# Patient Record
Sex: MALE | Employment: FULL TIME | URBAN - METROPOLITAN AREA
[De-identification: names, ages, dates, MRNs, and addresses within clinical notes are randomized per-mention and may not be internally consistent; named-entity substitution may affect disease eponyms.]

---

## 2023-10-18 ENCOUNTER — HOSPITAL ENCOUNTER (OUTPATIENT)
Dept: RADIOLOGY | Facility: HOSPITAL | Age: 48
Discharge: HOME | End: 2023-10-18
Payer: COMMERCIAL

## 2023-10-18 DIAGNOSIS — I10 ESSENTIAL (PRIMARY) HYPERTENSION: ICD-10-CM

## 2023-10-18 PROCEDURE — 75571 CT HRT W/O DYE W/CA TEST: CPT

## 2025-03-13 ENCOUNTER — HOSPITAL ENCOUNTER (OUTPATIENT)
Dept: RADIOLOGY | Facility: CLINIC | Age: 50
Discharge: HOME | End: 2025-03-13
Payer: COMMERCIAL

## 2025-03-13 DIAGNOSIS — R91.1 SOLITARY PULMONARY NODULE: ICD-10-CM

## 2025-03-13 PROCEDURE — 71250 CT THORAX DX C-: CPT

## 2025-05-24 ENCOUNTER — OFFICE VISIT (OUTPATIENT)
Dept: URGENT CARE | Age: 50
End: 2025-05-24
Payer: COMMERCIAL

## 2025-05-24 VITALS — SYSTOLIC BLOOD PRESSURE: 121 MMHG | HEART RATE: 82 BPM | OXYGEN SATURATION: 96 % | DIASTOLIC BLOOD PRESSURE: 78 MMHG

## 2025-05-24 DIAGNOSIS — J02.9 SORE THROAT: ICD-10-CM

## 2025-05-24 DIAGNOSIS — J01.90 ACUTE SINUSITIS, RECURRENCE NOT SPECIFIED, UNSPECIFIED LOCATION: Primary | ICD-10-CM

## 2025-05-24 DIAGNOSIS — H10.33 ACUTE CONJUNCTIVITIS OF BOTH EYES, UNSPECIFIED ACUTE CONJUNCTIVITIS TYPE: ICD-10-CM

## 2025-05-24 LAB
POC HUMAN RHINOVIRUS PCR: NEGATIVE
POC INFLUENZA A VIRUS PCR: NEGATIVE
POC INFLUENZA B VIRUS PCR: NEGATIVE
POC RESPIRATORY SYNCYTIAL VIRUS PCR: NEGATIVE
POC STREPTOCOCCUS PYOGENES (GROUP A STREP) PCR: NEGATIVE

## 2025-05-24 RX ORDER — AMOXICILLIN 875 MG/1
875 TABLET, COATED ORAL 2 TIMES DAILY
Qty: 14 TABLET | Refills: 0 | Status: SHIPPED | OUTPATIENT
Start: 2025-05-24 | End: 2025-05-31

## 2025-05-24 RX ORDER — CITALOPRAM 20 MG/1
1 TABLET ORAL
COMMUNITY
Start: 2025-04-26

## 2025-05-24 RX ORDER — POLYMYXIN B SULFATE AND TRIMETHOPRIM 1; 10000 MG/ML; [USP'U]/ML
1 SOLUTION OPHTHALMIC EVERY 6 HOURS
Qty: 10 ML | Refills: 0 | Status: SHIPPED | OUTPATIENT
Start: 2025-05-24 | End: 2025-05-31

## 2025-05-24 ASSESSMENT — ENCOUNTER SYMPTOMS
EYE REDNESS: 1
SORE THROAT: 1
CARDIOVASCULAR NEGATIVE: 1
RESPIRATORY NEGATIVE: 1
CONSTITUTIONAL NEGATIVE: 1
EYE DISCHARGE: 1

## 2025-05-24 NOTE — PROGRESS NOTES
Subjective   Patient ID: Shamir Schafer is a 49 y.o. male. They present today with a chief complaint of Sinusitis, Sore Throat, and Conjunctivitis (X 3 days ).    History of Present Illness  49-year-old male presents clinic today with complaints of sinus congestion, sore throat and red eyes.  Patient states he has had sinus congestion and pressure for 3 days.  He states he developed red eyes that were originally watery however now crusting and have mucus drainage.  He denies fever.  Denies chills.  Denies cough.  Denies ear pain.  Does not is slight sore throat.  He has been taking Coricidin for symptoms.      Sinusitis  Associated symptoms: congestion and sore throat    Associated symptoms: no ear pain    Sore Throat   Associated symptoms include congestion. Pertinent negatives include no ear pain.   Conjunctivitis  Associated symptoms: congestion and sore throat    Associated symptoms: no ear pain        Past Medical History  Allergies as of 05/24/2025    (No Known Allergies)       Prescriptions Prior to Admission[1]     Medical History[2]    Surgical History[3]         Review of Systems  Review of Systems   Constitutional: Negative.    HENT:  Positive for congestion and sore throat. Negative for ear pain.    Eyes:  Positive for discharge and redness.   Respiratory: Negative.     Cardiovascular: Negative.                                   Objective    Vitals:    05/24/25 1007   BP: 121/78   Pulse: 82   SpO2: 96%     No LMP for male patient.    Physical Exam  Constitutional:       General: He is not in acute distress.     Appearance: Normal appearance.   HENT:      Mouth/Throat:      Mouth: Mucous membranes are moist.      Pharynx: No oropharyngeal exudate or posterior oropharyngeal erythema.   Eyes:      Extraocular Movements: Extraocular movements intact.      Conjunctiva/sclera:      Right eye: Right conjunctiva is injected.      Left eye: Left conjunctiva is injected.      Pupils: Pupils are equal, round, and  reactive to light.   Cardiovascular:      Rate and Rhythm: Normal rate and regular rhythm.      Heart sounds: Normal heart sounds.   Pulmonary:      Effort: Pulmonary effort is normal.      Breath sounds: Normal breath sounds.   Neurological:      Mental Status: He is alert.         Procedures    Point of Care Test & Imaging Results from this visit  Results for orders placed or performed in visit on 05/24/25   POCT SPOTFIRE R/ST Panel Mini w/Strep A ([address]) manually resulted   Result Value Ref Range    POC Group A Strep, PCR Negative Negative    POC Respiratory Syncytial Virus PCR Negative Negative    POC Influenza A Virus PCR Negative Negative    POC Influenza B Virus PCR Negative Negative    POC Human Rhinovirus PCR Negative Negative      Imaging  No results found.    Cardiology, Vascular, and Other Imaging  No other imaging results found for the past 2 days      Diagnostic study results (if any) were reviewed by Micah Hunter PA-C.    Assessment/Plan   Allergies, medications, history, and pertinent labs/EKGs/Imaging reviewed by Micah Hunter PA-C.     Medical Decision Making  Patient pleasant cooperative on examination.    Cardiopulmonary examination within normal limits.  No acute abnormality noted to the oropharynx.    Bilateral eyes are injected.  PERRLA, EOM intact.    Spot fire testing with strep in office and negative.    I discussed with patient though testing is negative I have a lower concern for a bacterial etiology.  I do believe this to be viral and him to have a viral conjunctivitis.  Patient believes it to be bacterial in nature.  I did advise him that if I start him on drops and an antibiotic that this will not help with the viral infection.  He understands risks and benefits and would still like to proceed.    I started patient on amoxicillin along with Polytrim drops.    Advised on proper over-the-counter medication supplement with.    Monitor for worsening or present signs.    Patient  agreement plan.  Patient alert and oriented, no acute stress upon discharge.    Orders and Diagnoses  Diagnoses and all orders for this visit:  Sore throat  -     POCT SPOTFIRE R/ST Panel Mini w/Strep A ([facility]) manually resulted      Medical Admin Record      Patient disposition: Home    Electronically signed by Micah Hunter PA-C  10:31 AM           [1] (Not in a hospital admission)  [2] No past medical history on file.  [3] No past surgical history on file.

## 2025-05-24 NOTE — PATIENT INSTRUCTIONS
Consider taking Mucinex for congestion. Make sure to drink plenty of fluids while taking this medication as it increases its effectiveness.     Consider Zyrtec or Claritin    Consider Flonase Nasal Spray    Consider taking Sudafed to help decrease any congestion. If no history of high blood pressure.  Consider Corcedin BP for high blood pressure.    Use throat lozenges, buckwheat honey, gargling salt water to help relieve sore throat symptoms.     Recommend inhaling steam from a hot shower or hot bath as well as using saline sinus rinse for congestion. Recommend Sal Med sinus rinse. Make sure to use bottled or distilled water.

## 2026-01-14 ENCOUNTER — APPOINTMENT (OUTPATIENT)
Dept: DERMATOLOGY | Facility: CLINIC | Age: 51
End: 2026-01-14
Payer: COMMERCIAL